# Patient Record
Sex: FEMALE | NOT HISPANIC OR LATINO | URBAN - METROPOLITAN AREA
[De-identification: names, ages, dates, MRNs, and addresses within clinical notes are randomized per-mention and may not be internally consistent; named-entity substitution may affect disease eponyms.]

---

## 2020-10-26 ENCOUNTER — IMPORTED ENCOUNTER (OUTPATIENT)
Dept: URBAN - METROPOLITAN AREA CLINIC 1 | Facility: CLINIC | Age: 75
End: 2020-10-26

## 2020-10-26 PROBLEM — H40.033: Noted: 2020-10-26

## 2020-10-26 PROBLEM — H25.813: Noted: 2020-10-26

## 2020-10-26 PROCEDURE — 92015 DETERMINE REFRACTIVE STATE: CPT

## 2020-10-26 PROCEDURE — 92004 COMPRE OPH EXAM NEW PT 1/>: CPT

## 2020-10-26 NOTE — PATIENT DISCUSSION
1.  Cataract OU --  Visually Significant secondary to glare OU discussed the risks benefits alternatives and limitations of cataract surgery. The patient stated a full understanding and a desire to proceed with the procedure. The patient will need to return for preop appointment with cataract measurements and to have any additional questions answered and start pre-operative eye drops as directed. Phaco PCL OS then OD Otherwise follow-up2. Narrow Angles OU -- Defer consideration of PI given patient scheduling Phaco PCL in near future. Return for an appointment in [address] [place] [address]  with Dr. Anushka Rahman.

## 2020-11-12 ENCOUNTER — IMPORTED ENCOUNTER (OUTPATIENT)
Dept: URBAN - METROPOLITAN AREA CLINIC 1 | Facility: CLINIC | Age: 75
End: 2020-11-12

## 2020-11-12 PROBLEM — H25.812: Noted: 2020-11-12

## 2020-11-12 PROCEDURE — 92136 OPHTHALMIC BIOMETRY: CPT

## 2020-11-12 NOTE — PATIENT DISCUSSION
1. Cataract OS:  Visually Significant secondary to glare discussed the risks benefits alternatives and limitations of cataract surgery. The patient stated a full understanding and a desire to proceed with the procedure. Discussed with patient if PO Gtts are more than $120 for all three combined when filling at their Pharmacy please call our office to request generic substitutions. Pt understands they will need glasses post-op to achieve their best corrected vision. Phaco PCL OS  Lifestyle Questionnaire Completed. Return for an appointment for Return as scheduled with Dr. Ricardo Chan.

## 2020-11-20 ENCOUNTER — IMPORTED ENCOUNTER (OUTPATIENT)
Dept: URBAN - METROPOLITAN AREA CLINIC 1 | Facility: CLINIC | Age: 75
End: 2020-11-20

## 2020-11-21 ENCOUNTER — IMPORTED ENCOUNTER (OUTPATIENT)
Dept: URBAN - METROPOLITAN AREA CLINIC 1 | Facility: CLINIC | Age: 75
End: 2020-11-21

## 2020-11-21 PROBLEM — Z96.1: Noted: 2020-11-21

## 2020-11-21 PROCEDURE — 99024 POSTOP FOLLOW-UP VISIT: CPT

## 2020-11-21 NOTE — PATIENT DISCUSSION
POD#1 CE/IOL OS (Standard w/ LenSx) doing well. Mild IOP spike 1 gtt Combigan instilled prior to leaving Use Prednisolone BID OS Prolensa Qdaily OS Ocuflox TID OS : Use all three gtts through completion of PO gtt chart regimen/ Per our instructions given to patient.   Post op Warnings Reiterated RTC as scheduled

## 2020-11-30 ENCOUNTER — IMPORTED ENCOUNTER (OUTPATIENT)
Dept: URBAN - METROPOLITAN AREA CLINIC 1 | Facility: CLINIC | Age: 75
End: 2020-11-30

## 2020-11-30 PROBLEM — Z96.1: Noted: 2020-11-30

## 2020-11-30 PROBLEM — H25.811: Noted: 2020-11-30

## 2020-11-30 PROCEDURE — 92136 OPHTHALMIC BIOMETRY: CPT

## 2020-11-30 NOTE — PATIENT DISCUSSION
1.  Cataract OD: Visually Significant secondary to glare discussed the risks benefits alternatives and limitations of cataract surgery. The patient stated a full understanding and a desire to proceed with the procedure. Discussed with patient if PO Gtts are more than $120 for all three combined when filling at their Pharmacy please call our office to request generic substitutions. Pt understands there may be potential to need glasses post-op to achieve their best corrected vision. Phaco PCL OD 2. POW#1  CE/IOL OS (Standard w/ LenSx) doing well. Use Prednisolone BID OS & Prolensa Qdaily OS: Use through completion of po gtt regimen.  F/u as scheduled 2nd eye

## 2020-12-10 ENCOUNTER — IMPORTED ENCOUNTER (OUTPATIENT)
Dept: URBAN - METROPOLITAN AREA CLINIC 1 | Facility: CLINIC | Age: 75
End: 2020-12-10

## 2020-12-10 PROBLEM — Z96.1: Noted: 2020-12-10

## 2020-12-10 PROCEDURE — 99024 POSTOP FOLLOW-UP VISIT: CPT

## 2020-12-10 NOTE — PATIENT DISCUSSION
1. POD#1 Phaco/ PCL Standard w/ LenSx OD- doing well. Use Prednisolone BID OD Prolensa Qdaily OD Ocuflox TID OD Use all three gtts through completion of PO gtt chart regimen/ Per our instructions given. Post op Warnings Reiterated 2. POW#3 Phaco/ PCL Standard w/ LenSx OS- doing well Use Prednisolone BID OS && Prolensa Qdaily OS: Use through completion of po gtt regimen.  RTC as scheduled

## 2021-01-05 ENCOUNTER — IMPORTED ENCOUNTER (OUTPATIENT)
Dept: URBAN - METROPOLITAN AREA CLINIC 1 | Facility: CLINIC | Age: 76
End: 2021-01-05

## 2021-01-05 PROBLEM — Z09: Noted: 2021-01-05

## 2021-01-05 PROCEDURE — 99024 POSTOP FOLLOW-UP VISIT: CPT

## 2021-01-05 NOTE — PATIENT DISCUSSION
POW#3 Phaco/ PCL Standard w/ LenSx OUFinish PO meds per PO gtt schedule MRX for glasses givenReturn for an appointment in September for a 27 with Dr. Araceli Delatorre.

## 2021-09-13 ENCOUNTER — IMPORTED ENCOUNTER (OUTPATIENT)
Dept: URBAN - METROPOLITAN AREA CLINIC 1 | Facility: CLINIC | Age: 76
End: 2021-09-13

## 2021-09-13 PROBLEM — H16.143: Noted: 2021-09-13

## 2021-09-13 PROBLEM — Z96.1: Noted: 2021-09-13

## 2021-09-13 PROBLEM — H04.123: Noted: 2021-09-13

## 2021-09-13 PROBLEM — H26.493: Noted: 2021-09-13

## 2021-09-13 PROCEDURE — 99214 OFFICE O/P EST MOD 30 MIN: CPT

## 2021-09-13 PROCEDURE — 92015 DETERMINE REFRACTIVE STATE: CPT

## 2021-09-13 NOTE — PATIENT DISCUSSION
1.  PCO OU -- Visually Significant *secondary to glare*; schedule YAG Cap OD then OS (f/u 90 days after OS done). Pros cons risks and benefits. 2.  CHRISTY w/ PEK OU -- Recommend PF AT QID OU routinely. 3.  Pseudophakia OU (Standard w/LenSx OU)  4. Corneal Guttata OU -- Observe. Return for an appointment in YAG Cap OD then OS with Dr. Tomer Magana. Return for an appointment in 90days after OS YAG for 10/MRX/YAG po (Per [facility]) with Dr. Tomer Magana.

## 2021-10-01 ENCOUNTER — IMPORTED ENCOUNTER (OUTPATIENT)
Dept: URBAN - METROPOLITAN AREA CLINIC 1 | Facility: CLINIC | Age: 76
End: 2021-10-01

## 2021-10-01 PROBLEM — H26.491: Noted: 2021-10-01

## 2021-10-01 PROCEDURE — 66821 AFTER CATARACT LASER SURGERY: CPT

## 2021-10-15 ENCOUNTER — IMPORTED ENCOUNTER (OUTPATIENT)
Dept: URBAN - METROPOLITAN AREA CLINIC 1 | Facility: CLINIC | Age: 76
End: 2021-10-15

## 2021-10-15 PROBLEM — H26.492: Noted: 2021-10-15

## 2021-10-15 PROCEDURE — 66821 AFTER CATARACT LASER SURGERY: CPT

## 2021-10-15 NOTE — PATIENT DISCUSSION
YAG CAP OS: (Consent signed and scanned into attachments) 1 gtt Prolensa applied. The purpose and nature of the procedure possible alternative methods of treatment the risks involved and the possibility of complications were discussed with patient. The Patient wishes to proceed and the consent was signed. The laser was then performed under topical anesthesia with no complications. Post op instructions were given to patient as well as a follow-up appointment. Patient was advised to call our office if any questions or concerns. Return for an appointment in 1-4 week po/yag with Dr. Jose Caballero.

## 2022-01-20 ENCOUNTER — IMPORTED ENCOUNTER (OUTPATIENT)
Dept: URBAN - METROPOLITAN AREA CLINIC 1 | Facility: CLINIC | Age: 77
End: 2022-01-20

## 2022-01-20 PROBLEM — H16.143: Noted: 2022-01-20

## 2022-01-20 PROBLEM — H04.123: Noted: 2022-01-20

## 2022-01-20 PROCEDURE — 92012 INTRM OPH EXAM EST PATIENT: CPT

## 2022-01-20 NOTE — PATIENT DISCUSSION
1.  CHRISTY w/ PEK OU -- PEK improving with compliance. Recommend PF AT QID OU routinely. 2.  Pseudophakia OU (Standard w/LenSx OU) -- H/o YAG Cap OU 3. Corneal Guttata OU -- Observe. MRx for glasses given to patient. Return for an appointment in 1 year 27 with Dr. Mac Ortega.

## 2022-04-02 ASSESSMENT — VISUAL ACUITY
OD_CC: 20/25-2
OD_CC: 20/25
OS_PH: SC 20/25
OS_GLARE: 20/200
OS_CC: 20/30
OS_CC: J1
OS_SC: 20/20
OD_CC: 20/50
OS_CC: 20/30
OS_SC: 20/25
OD_GLARE: 20/50
OS_GLARE: 20/50
OD_CC: J1
OS_CC: 20/30
OD_CC: 20/30
OD_SC: 20/25-2
OD_GLARE: 20/200
OD_CC: 20/40
OS_CC: 20/40
OS_CC: 20/25+1
OS_CC: 20/25
OS_CC: 20/40+2
OD_SC: 20/20
OD_PH: SC 20/25

## 2022-04-02 ASSESSMENT — TONOMETRY
OS_IOP_MMHG: 12
OS_IOP_MMHG: 15
OD_IOP_MMHG: 12
OS_IOP_MMHG: 15
OS_IOP_MMHG: 12
OD_IOP_MMHG: 15
OD_IOP_MMHG: 14
OS_IOP_MMHG: 12
OD_IOP_MMHG: 13
OS_IOP_MMHG: 23
OS_IOP_MMHG: 14
OD_IOP_MMHG: 12

## 2022-12-16 ENCOUNTER — EMERGENCY VISIT (OUTPATIENT)
Dept: URBAN - METROPOLITAN AREA CLINIC 1 | Facility: CLINIC | Age: 77
End: 2022-12-16

## 2022-12-16 PROCEDURE — 99213 OFFICE O/P EST LOW 20 MIN: CPT

## 2022-12-16 RX ORDER — FLUOROMETHOLONE ACETATE 1 MG/ML: 1 SUSPENSION/ DROPS OPHTHALMIC TWICE A DAY

## 2022-12-16 ASSESSMENT — TONOMETRY
OD_IOP_MMHG: 14
OS_IOP_MMHG: 14

## 2022-12-16 ASSESSMENT — VISUAL ACUITY
OD_SC: 20/25
OS_SC: 20/25-1

## 2022-12-16 NOTE — PATIENT DISCUSSION
Observe. Cephalexin Counseling: I counseled the patient regarding use of cephalexin as an antibiotic for prophylactic and/or therapeutic purposes. Cephalexin (commonly prescribed under brand name Keflex) is a cephalosporin antibiotic which is active against numerous classes of bacteria, including most skin bacteria. Side effects may include nausea, diarrhea, gastrointestinal upset, rash, hives, yeast infections, and in rare cases, hepatitis, kidney disease, seizures, fever, confusion, neurologic symptoms, and others. Patients with severe allergies to penicillin medications are cautioned that there is about a 10% incidence of cross-reactivity with cephalosporins. When possible, patients with penicillin allergies should use alternatives to cephalosporins for antibiotic therapy.

## 2022-12-16 NOTE — PATIENT DISCUSSION
Increased PEK noted on exam, no relief from increased AT's. Patient will begin Flarex BID OU (erx'd to [place] and sample given) and OTC Ointment QHS OU (handout given). Will f/u as scheduled with [facility].

## 2023-01-17 ENCOUNTER — COMPREHENSIVE EXAM (OUTPATIENT)
Dept: URBAN - METROPOLITAN AREA CLINIC 1 | Facility: CLINIC | Age: 78
End: 2023-01-17

## 2023-01-17 DIAGNOSIS — H16.143: ICD-10-CM

## 2023-01-17 DIAGNOSIS — H04.123: ICD-10-CM

## 2023-01-17 PROCEDURE — 99214 OFFICE O/P EST MOD 30 MIN: CPT

## 2023-01-17 ASSESSMENT — TONOMETRY
OD_IOP_MMHG: 14
OS_IOP_MMHG: 14

## 2023-01-17 ASSESSMENT — VISUAL ACUITY
OD_SC: 20/25
OS_SC: 20/25

## 2023-01-17 NOTE — PATIENT DISCUSSION
Increased PEK noted again on exam today. Recommend PF ATs QID OU. Recommend instilling ATs right before instilling Restasis. Patient to D/C Flarex BID OU and begin Restasis BID OU(Erx'd).

## 2023-04-24 ENCOUNTER — FOLLOW UP (OUTPATIENT)
Dept: URBAN - METROPOLITAN AREA CLINIC 1 | Facility: CLINIC | Age: 78
End: 2023-04-24

## 2023-04-24 DIAGNOSIS — H16.143: ICD-10-CM

## 2023-04-24 DIAGNOSIS — H04.123: ICD-10-CM

## 2023-04-24 PROCEDURE — 99213 OFFICE O/P EST LOW 20 MIN: CPT

## 2023-04-24 ASSESSMENT — VISUAL ACUITY
OD_CC: 20/20
OS_CC: 20/25

## 2023-04-24 ASSESSMENT — TONOMETRY
OD_IOP_MMHG: 14
OS_IOP_MMHG: 14

## 2024-01-05 ENCOUNTER — COMPREHENSIVE EXAM (OUTPATIENT)
Dept: URBAN - METROPOLITAN AREA CLINIC 1 | Facility: CLINIC | Age: 79
End: 2024-01-05

## 2024-01-05 DIAGNOSIS — H04.123: ICD-10-CM

## 2024-01-05 DIAGNOSIS — H35.3131: ICD-10-CM

## 2024-01-05 PROCEDURE — 99214 OFFICE O/P EST MOD 30 MIN: CPT

## 2024-01-05 ASSESSMENT — VISUAL ACUITY
OD_SC: 20/20-2
OS_SC: 20/25-1
OD_CC: J2
OS_CC: J2

## 2024-01-05 ASSESSMENT — TONOMETRY
OS_IOP_MMHG: 11
OD_IOP_MMHG: 11

## 2024-08-01 ENCOUNTER — FOLLOW UP (OUTPATIENT)
Dept: URBAN - METROPOLITAN AREA CLINIC 1 | Facility: CLINIC | Age: 79
End: 2024-08-01

## 2024-08-01 DIAGNOSIS — H35.3131: ICD-10-CM

## 2024-08-01 PROCEDURE — 99213 OFFICE O/P EST LOW 20 MIN: CPT

## 2024-08-01 PROCEDURE — 92134 CPTRZ OPH DX IMG PST SGM RTA: CPT

## 2024-08-01 ASSESSMENT — TONOMETRY
OS_IOP_MMHG: 12
OD_IOP_MMHG: 12

## 2024-08-01 ASSESSMENT — VISUAL ACUITY
OD_SC: J3
OD_SC: 20/25+1
OS_SC: J3
OS_SC: 20/25-1